# Patient Record
Sex: FEMALE | Race: WHITE | NOT HISPANIC OR LATINO | Employment: FULL TIME | URBAN - METROPOLITAN AREA
[De-identification: names, ages, dates, MRNs, and addresses within clinical notes are randomized per-mention and may not be internally consistent; named-entity substitution may affect disease eponyms.]

---

## 2021-01-17 ENCOUNTER — IMMUNIZATIONS (OUTPATIENT)
Dept: FAMILY MEDICINE CLINIC | Facility: HOSPITAL | Age: 61
End: 2021-01-17

## 2021-01-17 DIAGNOSIS — Z23 ENCOUNTER FOR IMMUNIZATION: Primary | ICD-10-CM

## 2021-01-17 PROCEDURE — 91300 SARS-COV-2 / COVID-19 MRNA VACCINE (PFIZER-BIONTECH) 30 MCG: CPT

## 2021-01-17 PROCEDURE — 0001A SARS-COV-2 / COVID-19 MRNA VACCINE (PFIZER-BIONTECH) 30 MCG: CPT

## 2021-02-05 ENCOUNTER — IMMUNIZATIONS (OUTPATIENT)
Dept: FAMILY MEDICINE CLINIC | Facility: HOSPITAL | Age: 61
End: 2021-02-05

## 2021-02-05 DIAGNOSIS — Z23 ENCOUNTER FOR IMMUNIZATION: Primary | ICD-10-CM

## 2021-02-05 PROCEDURE — 91300 SARS-COV-2 / COVID-19 MRNA VACCINE (PFIZER-BIONTECH) 30 MCG: CPT

## 2021-02-05 PROCEDURE — 0002A SARS-COV-2 / COVID-19 MRNA VACCINE (PFIZER-BIONTECH) 30 MCG: CPT

## 2021-03-20 ENCOUNTER — HOSPITAL ENCOUNTER (EMERGENCY)
Facility: HOSPITAL | Age: 61
Discharge: HOME | End: 2021-03-20
Attending: EMERGENCY MEDICINE | Admitting: EMERGENCY MEDICINE

## 2021-03-20 ENCOUNTER — APPOINTMENT (EMERGENCY)
Dept: RADIOLOGY | Facility: HOSPITAL | Age: 61
End: 2021-03-20

## 2021-03-20 ENCOUNTER — APPOINTMENT (EMERGENCY)
Dept: CT IMAGING | Facility: HOSPITAL | Age: 61
End: 2021-03-20

## 2021-03-20 VITALS
HEIGHT: 64 IN | WEIGHT: 121 LBS | HEART RATE: 86 BPM | DIASTOLIC BLOOD PRESSURE: 95 MMHG | RESPIRATION RATE: 18 BRPM | BODY MASS INDEX: 20.66 KG/M2 | OXYGEN SATURATION: 97 % | TEMPERATURE: 98.4 F | SYSTOLIC BLOOD PRESSURE: 185 MMHG

## 2021-03-20 DIAGNOSIS — S92.001A CLOSED NONDISPLACED FRACTURE OF RIGHT CALCANEUS, UNSPECIFIED PORTION OF CALCANEUS, INITIAL ENCOUNTER: Primary | ICD-10-CM

## 2021-03-20 LAB
ANION GAP SERPL CALCULATED.3IONS-SCNC: 10 MMOL/L (ref 4–13)
APTT PPP: 35 SECONDS (ref 23–37)
BASOPHILS # BLD AUTO: 0.06 THOUSANDS/ΜL (ref 0–0.1)
BASOPHILS NFR BLD AUTO: 0 % (ref 0–1)
BUN SERPL-MCNC: 11 MG/DL (ref 5–25)
CALCIUM SERPL-MCNC: 9.1 MG/DL (ref 8.3–10.1)
CHLORIDE SERPL-SCNC: 102 MMOL/L (ref 100–108)
CO2 SERPL-SCNC: 23 MMOL/L (ref 21–32)
CREAT SERPL-MCNC: 0.74 MG/DL (ref 0.6–1.3)
EOSINOPHIL # BLD AUTO: 0.01 THOUSAND/ΜL (ref 0–0.61)
EOSINOPHIL NFR BLD AUTO: 0 % (ref 0–6)
ERYTHROCYTE [DISTWIDTH] IN BLOOD BY AUTOMATED COUNT: 13.7 % (ref 11.6–15.1)
GFR SERPL CREATININE-BSD FRML MDRD: 90 ML/MIN/1.73SQ M
GLUCOSE SERPL-MCNC: 100 MG/DL (ref 65–140)
HCT VFR BLD AUTO: 41.2 % (ref 34.8–46.1)
HGB BLD-MCNC: 14.1 G/DL (ref 11.5–15.4)
IMM GRANULOCYTES # BLD AUTO: 0.1 THOUSAND/UL (ref 0–0.2)
IMM GRANULOCYTES NFR BLD AUTO: 1 % (ref 0–2)
INR PPP: 0.94 (ref 0.84–1.19)
LYMPHOCYTES # BLD AUTO: 1.25 THOUSANDS/ΜL (ref 0.6–4.47)
LYMPHOCYTES NFR BLD AUTO: 8 % (ref 14–44)
MCH RBC QN AUTO: 28.7 PG (ref 26.8–34.3)
MCHC RBC AUTO-ENTMCNC: 34.2 G/DL (ref 31.4–37.4)
MCV RBC AUTO: 84 FL (ref 82–98)
MONOCYTES # BLD AUTO: 1.36 THOUSAND/ΜL (ref 0.17–1.22)
MONOCYTES NFR BLD AUTO: 8 % (ref 4–12)
NEUTROPHILS # BLD AUTO: 13.59 THOUSANDS/ΜL (ref 1.85–7.62)
NEUTS SEG NFR BLD AUTO: 83 % (ref 43–75)
NRBC BLD AUTO-RTO: 0 /100 WBCS
PLATELET # BLD AUTO: 334 THOUSANDS/UL (ref 149–390)
PMV BLD AUTO: 9.2 FL (ref 8.9–12.7)
POTASSIUM SERPL-SCNC: 4.3 MMOL/L (ref 3.5–5.3)
PROTHROMBIN TIME: 12.7 SECONDS (ref 11.6–14.5)
RBC # BLD AUTO: 4.91 MILLION/UL (ref 3.81–5.12)
SODIUM SERPL-SCNC: 135 MMOL/L (ref 136–145)
WBC # BLD AUTO: 16.37 THOUSAND/UL (ref 4.31–10.16)

## 2021-03-20 PROCEDURE — 99285 EMERGENCY DEPT VISIT HI MDM: CPT | Performed by: PHYSICIAN ASSISTANT

## 2021-03-20 PROCEDURE — 74177 CT ABD & PELVIS W/CONTRAST: CPT

## 2021-03-20 PROCEDURE — 29515 APPLICATION SHORT LEG SPLINT: CPT | Performed by: EMERGENCY MEDICINE

## 2021-03-20 PROCEDURE — 73630 X-RAY EXAM OF FOOT: CPT

## 2021-03-20 PROCEDURE — 99284 EMERGENCY DEPT VISIT MOD MDM: CPT

## 2021-03-20 PROCEDURE — 97163 PT EVAL HIGH COMPLEX 45 MIN: CPT

## 2021-03-20 PROCEDURE — 85025 COMPLETE CBC W/AUTO DIFF WBC: CPT | Performed by: EMERGENCY MEDICINE

## 2021-03-20 PROCEDURE — 85610 PROTHROMBIN TIME: CPT | Performed by: EMERGENCY MEDICINE

## 2021-03-20 PROCEDURE — NC001 PR NO CHARGE: Performed by: NURSE PRACTITIONER

## 2021-03-20 PROCEDURE — 99284 EMERGENCY DEPT VISIT MOD MDM: CPT | Performed by: SURGERY

## 2021-03-20 PROCEDURE — 96376 TX/PRO/DX INJ SAME DRUG ADON: CPT

## 2021-03-20 PROCEDURE — 96374 THER/PROPH/DIAG INJ IV PUSH: CPT

## 2021-03-20 PROCEDURE — 85730 THROMBOPLASTIN TIME PARTIAL: CPT | Performed by: EMERGENCY MEDICINE

## 2021-03-20 PROCEDURE — 73700 CT LOWER EXTREMITY W/O DYE: CPT

## 2021-03-20 PROCEDURE — 36415 COLL VENOUS BLD VENIPUNCTURE: CPT | Performed by: EMERGENCY MEDICINE

## 2021-03-20 PROCEDURE — G1004 CDSM NDSC: HCPCS

## 2021-03-20 PROCEDURE — 80048 BASIC METABOLIC PNL TOTAL CA: CPT | Performed by: EMERGENCY MEDICINE

## 2021-03-20 PROCEDURE — 99244 OFF/OP CNSLTJ NEW/EST MOD 40: CPT | Performed by: ORTHOPAEDIC SURGERY

## 2021-03-20 PROCEDURE — 96375 TX/PRO/DX INJ NEW DRUG ADDON: CPT

## 2021-03-20 RX ORDER — OXYCODONE HYDROCHLORIDE 5 MG/1
5 TABLET ORAL EVERY 4 HOURS PRN
Qty: 30 TABLET | Refills: 0 | Status: SHIPPED | OUTPATIENT
Start: 2021-03-20 | End: 2021-03-30

## 2021-03-20 RX ORDER — METHOCARBAMOL 500 MG/1
750 TABLET, FILM COATED ORAL EVERY 6 HOURS SCHEDULED
Status: DISCONTINUED | OUTPATIENT
Start: 2021-03-20 | End: 2021-03-20 | Stop reason: HOSPADM

## 2021-03-20 RX ORDER — ACETAMINOPHEN 325 MG/1
975 TABLET ORAL EVERY 8 HOURS SCHEDULED
Status: DISCONTINUED | OUTPATIENT
Start: 2021-03-20 | End: 2021-03-20 | Stop reason: HOSPADM

## 2021-03-20 RX ORDER — HYDROMORPHONE HCL/PF 1 MG/ML
0.2 SYRINGE (ML) INJECTION
Status: DISCONTINUED | OUTPATIENT
Start: 2021-03-20 | End: 2021-03-20 | Stop reason: HOSPADM

## 2021-03-20 RX ORDER — METHOCARBAMOL 750 MG/1
750 TABLET, FILM COATED ORAL EVERY 6 HOURS SCHEDULED
Qty: 45 TABLET | Refills: 0 | Status: SHIPPED | OUTPATIENT
Start: 2021-03-20 | End: 2021-05-07

## 2021-03-20 RX ORDER — OXYCODONE HYDROCHLORIDE 5 MG/1
5 TABLET ORAL EVERY 4 HOURS PRN
Status: DISCONTINUED | OUTPATIENT
Start: 2021-03-20 | End: 2021-03-20 | Stop reason: HOSPADM

## 2021-03-20 RX ORDER — ACETAMINOPHEN 325 MG/1
975 TABLET ORAL EVERY 8 HOURS
Qty: 30 TABLET | Refills: 0 | Status: SHIPPED | OUTPATIENT
Start: 2021-03-20 | End: 2021-05-07

## 2021-03-20 RX ORDER — GABAPENTIN 100 MG/1
100 CAPSULE ORAL 3 TIMES DAILY
Status: DISCONTINUED | OUTPATIENT
Start: 2021-03-20 | End: 2021-03-20 | Stop reason: HOSPADM

## 2021-03-20 RX ORDER — OXYCODONE HYDROCHLORIDE 10 MG/1
10 TABLET ORAL EVERY 4 HOURS PRN
Status: DISCONTINUED | OUTPATIENT
Start: 2021-03-20 | End: 2021-03-20 | Stop reason: HOSPADM

## 2021-03-20 RX ORDER — GABAPENTIN 100 MG/1
100 CAPSULE ORAL 3 TIMES DAILY
Qty: 60 CAPSULE | Refills: 0 | Status: SHIPPED | OUTPATIENT
Start: 2021-03-20 | End: 2021-05-07

## 2021-03-20 RX ORDER — OXYCODONE HYDROCHLORIDE AND ACETAMINOPHEN 5; 325 MG/1; MG/1
1 TABLET ORAL ONCE
Status: COMPLETED | OUTPATIENT
Start: 2021-03-20 | End: 2021-03-20

## 2021-03-20 RX ORDER — HYDROMORPHONE HCL/PF 1 MG/ML
0.5 SYRINGE (ML) INJECTION ONCE
Status: COMPLETED | OUTPATIENT
Start: 2021-03-20 | End: 2021-03-20

## 2021-03-20 RX ORDER — ONDANSETRON 2 MG/ML
4 INJECTION INTRAMUSCULAR; INTRAVENOUS ONCE
Status: COMPLETED | OUTPATIENT
Start: 2021-03-20 | End: 2021-03-20

## 2021-03-20 RX ADMIN — ACETAMINOPHEN 975 MG: 325 TABLET, FILM COATED ORAL at 08:00

## 2021-03-20 RX ADMIN — HYDROMORPHONE HYDROCHLORIDE 0.2 MG: 1 INJECTION, SOLUTION INTRAMUSCULAR; INTRAVENOUS; SUBCUTANEOUS at 12:38

## 2021-03-20 RX ADMIN — HYDROMORPHONE HYDROCHLORIDE 0.5 MG: 1 INJECTION, SOLUTION INTRAMUSCULAR; INTRAVENOUS; SUBCUTANEOUS at 09:08

## 2021-03-20 RX ADMIN — OXYCODONE HYDROCHLORIDE AND ACETAMINOPHEN 1 TABLET: 5; 325 TABLET ORAL at 06:44

## 2021-03-20 RX ADMIN — ONDANSETRON 4 MG: 2 INJECTION INTRAMUSCULAR; INTRAVENOUS at 09:07

## 2021-03-20 RX ADMIN — METHOCARBAMOL TABLETS 750 MG: 500 TABLET, COATED ORAL at 08:15

## 2021-03-20 RX ADMIN — OXYCODONE HYDROCHLORIDE 10 MG: 10 TABLET ORAL at 11:20

## 2021-03-20 RX ADMIN — GABAPENTIN 100 MG: 100 CAPSULE ORAL at 08:15

## 2021-03-20 RX ADMIN — IOHEXOL 100 ML: 350 INJECTION, SOLUTION INTRAVENOUS at 10:16

## 2021-03-20 NOTE — PLAN OF CARE
Problem: PHYSICAL THERAPY ADULT  Goal: Performs mobility at highest level of function for planned discharge setting  See evaluation for individualized goals  Description: Treatment/Interventions: Functional transfer training, LE strengthening/ROM, Elevations, Therapeutic exercise, Endurance training, Patient/family training, Equipment eval/education, Bed mobility, Gait training  Equipment Recommended: Lorie Martinez       See flowsheet documentation for full assessment, interventions and recommendations  Note: Prognosis: Good  Problem List: Decreased strength, Decreased endurance, Impaired balance, Decreased mobility, Decreased safety awareness, Orthopedic restrictions, Pain  Assessment: Pt is a 64 y o  female seen for PT evaluation s/p admit to [address] [place] [address] on 3/20/2021 w/ calcaneal fracture  Order placed for PT  Comorbidities affecting pt's physical performance at time of assessment listed above  Personal factors affecting pt at time of IE include: multi-level environment, limited home support, inability to perform IADLs, inability to perform ADLs, inability to ambulate household distances, limited insight into impairments and recent fall(s)  Prior to admission, pt was was independent w/ all functional mobility w/ out AD, ambulated community distances and elevations, lived in multi-level home, lived with  and worked full time  Upon evaluation: Pt requires mod I for bed mobility, supervision for sit to stand, and supervision for ambulation with RW  (Please find full objective findings from PT assessment regarding body systems outlined above)  Impairments and limitations also listed above, especially due to  weakness, impaired balance, decreased endurance, gait deviations, pain, decreased activity tolerance, decreased safety awareness, fall risk and orthopedic restrictions  The following objective measures performed on IE also reveal limitations: Barthel Index 65/100   Pt's clinical presentation is currently unstable/unpredictable seen in pt's presentation of decreased safety awareness, fall risk, and new WBS  Pt to benefit from continued skilled PT tx while in hospital and upon DC to address deficits as defined above and maximize level of functional mobility  From PT/mobility standpoint, recommendation at time of d/c would be home with family support and with rolling walker pending progress  Recommend progression of ambulation and stair negotiation as appropriate  PT Discharge Recommendation: Return to previous environment with social support          See flowsheet documentation for full assessment

## 2021-03-20 NOTE — H&P
H&P Exam - Trauma   Cass Dowell 64 y o  female MRN: [id number]  Unit/Bed#: ED 27 Encounter: [id number]    Assessment/Plan   Trauma Alert: Evaluation  Model of Arrival: Self  Trauma Team: Attending To and MAHSA Read  Consultants: Orthopedics: spoke with Dr Susan Harvey, in house and seen patient  Discussed plan of care, placed Bethea Splint on patient  Trauma Active Problems: Right calcaneal fracture    Trauma Plan: Ortho consulted  Further imaging  NWB RLE  Bethea splint applied  Follow up with Ortho in 1 week  PT/OT  CT a/p - fall from height,     Chief Complaint: Right heel pain    History of Present Illness   HPI:  Cass Dowell is a 64 y o  female who presents with right foot pain  States she experienced a fall yesterday somewhere from 4-5 feet straight down, landed on her feet between the air conditioning units  Reports dod not strike her head or any other part of her body but her feet  Instant pain in right foot, through night would crawl to get to bathroom , etc   This morning came to ER for evaluation  X-rays demonstrate Right calcaneal fracture  No complaint of chest pain or shortness of breath  No complaint of abdominal pain, mild low back pain which she states is normal for her  Right foot splinted by ER, Ortho consulted and seen patinet  Mechanism:Fall - 4-5 foot height  Review of Systems   Constitutional: Positive for activity change  HENT: Negative  Eyes: Negative  Respiratory: Negative  Cardiovascular: Negative  Gastrointestinal: Negative  Endocrine: Negative  Genitourinary: Negative  Musculoskeletal:        Right heel pain   Skin: Negative  Allergic/Immunologic: Negative  Neurological: Negative  Hematological: Negative  Psychiatric/Behavioral: Negative  12-point, complete review of systems was reviewed and negative except as stated above  Historical Information   History is obtainable from the patient    Efforts to obtain history included the following sources:  also present in room    History reviewed  No pertinent past medical history  Past Surgical History:   Procedure Laterality Date    HYSTERECTOMY       Social History   Social History     Substance and Sexual Activity   Alcohol Use Yes    Frequency: 2-3 times a week    Drinks per session: 1 or 2    Binge frequency: Weekly     Social History     Substance and Sexual Activity   Drug Use Never     Social History     Tobacco Use   Smoking Status Current Every Day Smoker    Packs/day: 1 00    Types: Cigarettes   Smokeless Tobacco Never Used     E-Cigarette/Vaping     E-Cigarette/Vaping Substances     Immunization History   Administered Date(s) Administered    SARS-CoV-2 / COVID-19 mRNA IM ([employer]) 01/17/2021, 02/05/2021     Last Tetanus: UTD  Family History: Non-contributory        Meds/Allergies   all current active meds have been reviewed    No Known Allergies      PHYSICAL EXAM      Objective   Vitals:   First set: Temperature: 98 4 °F (36 9 °C) (03/20/21 0640)  Pulse: (!) 111 (03/20/21 0640)  Respirations: 20 (03/20/21 0640)  Blood Pressure: (!) 225/131 (03/20/21 0640)    Primary Survey:   (A) Airway: patent and intact  (B) Breathing: non-labored  (C) Circulation: Pulses:   Normal  (D) Disabliity:  GCS Total:  15, Eye Opening:   Spontaneous = 4, Motor Response: Obeys commands = 6 and Verbal Response:  Oriented = 5  (E) Expose:  Completed    Secondary Survey: (Click on Physical Exam tab above)  Physical Exam  Vitals signs reviewed  Constitutional:       Appearance: Normal appearance  HENT:      Head: Normocephalic and atraumatic  Right Ear: Tympanic membrane normal       Left Ear: Tympanic membrane normal       Nose: Nose normal       Mouth/Throat:      Pharynx: Oropharynx is clear  Eyes:      Extraocular Movements: Extraocular movements intact  Conjunctiva/sclera: Conjunctivae normal       Pupils: Pupils are equal, round, and reactive to light     Neck: Musculoskeletal: Normal range of motion and neck supple  Cardiovascular:      Rate and Rhythm: Normal rate and regular rhythm  Pulses: Normal pulses  Heart sounds: Normal heart sounds  Pulmonary:      Effort: Pulmonary effort is normal  No respiratory distress  Breath sounds: Normal breath sounds  No stridor  No wheezing, rhonchi or rales  Chest:      Chest wall: No tenderness  Abdominal:      General: Bowel sounds are normal       Palpations: Abdomen is soft  Genitourinary:     Comments: deferred  Musculoskeletal:         General: Swelling, tenderness and signs of injury present  Right lower leg: Edema present  Skin:     General: Skin is warm and dry  Capillary Refill: Capillary refill takes less than 2 seconds  Neurological:      General: No focal deficit present  Mental Status: She is alert and oriented to person, place, and time  Psychiatric:         Mood and Affect: Mood normal          Behavior: Behavior normal          Thought Content: Thought content normal          Judgment: Judgment normal          Invasive Devices     None                 Lab Results: Results for William Mcmullen (MRN [id number]) as of 3/20/2021 10:15   Ref   Range 3/20/2021 07:06 3/20/2021 08:57   Sodium Latest Ref Range: 136 - 145 mmol/L  135 (L)   Potassium Latest Ref Range: 3 5 - 5 3 mmol/L  4 3   Chloride Latest Ref Range: 100 - 108 mmol/L  102   CO2 Latest Ref Range: 21 - 32 mmol/L  23   Anion Gap Latest Ref Range: 4 - 13 mmol/L  10   BUN Latest Ref Range: 5 - 25 mg/dL  11   Creatinine Latest Ref Range: 0 60 - 1 30 mg/dL  0 74   Glucose, Random Latest Ref Range: 65 - 140 mg/dL  100   Calcium Latest Ref Range: 8 3 - 10 1 mg/dL  9 1   eGFR Latest Units: ml/min/1 73sq m  90   WBC Latest Ref Range: 4 31 - 10 16 Thousand/uL  16 37 (H)   Red Blood Cell Count Latest Ref Range: 3 81 - 5 12 Million/uL  4 91   Hemoglobin Latest Ref Range: 11 5 - 15 4 g/dL  14 1   HCT Latest Ref Range: 34 8 - 46 1 % 41 2   MCV Latest Ref Range: 82 - 98 fL  84   MCH Latest Ref Range: 26 8 - 34 3 pg  28 7   MCHC Latest Ref Range: 31 4 - 37 4 g/dL  34 2   RDW Latest Ref Range: 11 6 - 15 1 %  13 7   Platelet Count Latest Ref Range: 149 - 390 Thousands/uL  334   MPV Latest Ref Range: 8 9 - 12 7 fL  9 2   nRBC Latest Units: /100 WBCs  0   Neutrophils % Latest Ref Range: 43 - 75 %  83 (H)   Immat GRANS % Latest Ref Range: 0 - 2 %  1   Lymphocytes Relative Latest Ref Range: 14 - 44 %  8 (L)   Monocytes Relative Latest Ref Range: 4 - 12 %  8   Eosinophils Latest Ref Range: 0 - 6 %  0   Basophils Relative Latest Ref Range: 0 - 1 %  0   Immature Grans Absolute Latest Ref Range: 0 00 - 0 20 Thousand/uL  0 10   Absolute Neutrophils Latest Ref Range: 1 85 - 7 62 Thousands/µL  13 59 (H)   Lymphocytes Absolute Latest Ref Range: 0 60 - 4 47 Thousands/µL  1 25   Absolute Monocytes Latest Ref Range: 0 17 - 1 22 Thousand/µL  1 36 (H)   Absolute Eosinophils Latest Ref Range: 0 00 - 0 61 Thousand/µL  0 01   Basophils Absolute Latest Ref Range: 0 00 - 0 10 Thousands/µL  0 06   Protime Latest Ref Range: 11 6 - 14 5 seconds  12 7   INR Latest Ref Range: 0 84 - 1 19   0 94   PTT Latest Ref Range: 23 - 37 seconds  35   XR FOOT 3+ VW LEFT Unknown Rpt    XR FOOT 3+ VW RIGHT Unknown Rpt      Imaging/EKG Studies:   Other Studies: Left foot - neg  Right foot x-ray - Multiple fractures of the calcaneus without significant displacement  These could be further evaluated with CT  CT A/P -  CT Right foot -     Code Status: No Order  Advance Directive and Living Will:      Power of :    POLST:

## 2021-03-20 NOTE — ED PROVIDER NOTES
History  Chief Complaint   Patient presents with    Leg Injury     Patient reports loss of balance, falling off 4ft steps and landing on b/l balls of feet - occurred last evening  Icing/elevating/motrin overnight w/o relief to pain  Patient is a 56-year-old female who presents to the emergency department for evaluation of bilateral foot pain  She states she lost her balance and fell from approximately 4 ft landing on the balls of her feet  She states she has been having pain, particularly in her right foot since  She states the pain is mostly in her heels bilaterally  She took Advil last night, however she got no relief  She states she was up all night because of the pain  The patient is unable to bear weight on her right foot  She has not taken anything yet this morning for the pain  She did not hit her head or lose consciousness during the fall  She is not on any blood thinners  She reports no further injuries at this time  History provided by:  Patient   used: No        None       History reviewed  No pertinent past medical history  Past Surgical History:   Procedure Laterality Date    HYSTERECTOMY         History reviewed  No pertinent family history  I have reviewed and agree with the history as documented  E-Cigarette/Vaping     E-Cigarette/Vaping Substances     Social History     Tobacco Use    Smoking status: Current Every Day Smoker     Packs/day: 1 00     Types: Cigarettes    Smokeless tobacco: Never Used   Substance Use Topics    Alcohol use: Yes     Frequency: 2-3 times a week     Drinks per session: 1 or 2     Binge frequency: Weekly    Drug use: Never       Review of Systems   Constitutional: Negative for chills and fever  HENT: Negative for ear pain and sore throat  Eyes: Negative for redness and visual disturbance  Respiratory: Negative for cough and shortness of breath  Cardiovascular: Negative for chest pain     Gastrointestinal: Negative for abdominal pain, diarrhea, nausea and vomiting  Genitourinary: Negative for dysuria and hematuria  Musculoskeletal: Positive for myalgias  Negative for back pain, neck pain and neck stiffness  Skin: Positive for color change (Bruising)  Negative for rash  Neurological: Negative for dizziness, light-headedness and headaches  All other systems reviewed and are negative  Physical Exam  Physical Exam  Vitals signs and nursing note reviewed  Constitutional:       General: She is not in acute distress  Appearance: She is well-developed  She is not ill-appearing or toxic-appearing  HENT:      Head: Normocephalic and atraumatic  Mouth/Throat:      Pharynx: Uvula midline  Eyes:      General: Lids are normal       Conjunctiva/sclera: Conjunctivae normal    Neck:      Musculoskeletal: Normal range of motion and neck supple  Cardiovascular:      Pulses: Normal pulses  Pulmonary:      Effort: Pulmonary effort is normal  No respiratory distress  Musculoskeletal:      Right ankle: Normal       Left ankle: Normal       Right foot: Decreased range of motion  Tenderness, bony tenderness and swelling present  Left foot: Tenderness and bony tenderness present  Feet:    Skin:     General: Skin is warm and dry  Neurological:      General: No focal deficit present  Mental Status: She is alert and oriented to person, place, and time  Sensory: Sensation is intact           Vital Signs  ED Triage Vitals [03/20/21 0640]   Temperature Pulse Respirations Blood Pressure SpO2   98 4 °F (36 9 °C) (!) 111 20 (!) 225/131 100 %      Temp Source Heart Rate Source Patient Position - Orthostatic VS BP Location FiO2 (%)   Oral Monitor Sitting Left arm --      Pain Score       8           Vitals:    03/20/21 0900 03/20/21 1000 03/20/21 1100 03/20/21 1200   BP: (!) 212/98 (!) 208/100 170/87 (!) 185/95   Pulse: (!) 106 96 82 86   Patient Position - Orthostatic VS: Lying Lying Lying Lying Visual Acuity  Visual Acuity      Most Recent Value   L Pupil Size (mm)  3   R Pupil Size (mm)  3          ED Medications  Medications   oxyCODONE-acetaminophen (PERCOCET) 5-325 mg per tablet 1 tablet (1 tablet Oral Given 3/20/21 0644)   ondansetron (ZOFRAN) injection 4 mg (4 mg Intravenous Given 3/20/21 0907)   HYDROmorphone (DILAUDID) injection 0 5 mg (0 5 mg Intravenous Given 3/20/21 0908)   iohexol (OMNIPAQUE) 350 MG/ML injection (MULTI-DOSE) 100 mL (100 mL Intravenous Given 3/20/21 1016)       Diagnostic Studies  Results Reviewed     Procedure Component Value Units Date/Time    Basic metabolic panel [[id number]]  (Abnormal) Collected: 03/20/21 0857    Lab Status: Final result Specimen: Blood from Arm, Right Updated: 03/20/21 0947     Sodium 135 mmol/L      Potassium 4 3 mmol/L      Chloride 102 mmol/L      CO2 23 mmol/L      ANION GAP 10 mmol/L      BUN 11 mg/dL      Creatinine 0 74 mg/dL      Glucose 100 mg/dL      Calcium 9 1 mg/dL      eGFR 90 ml/min/1 73sq m     Narrative:      [place] guidelines for Chronic Kidney Disease (CKD):     Stage 1 with normal or high GFR (GFR > 90 mL/min/1 73 square meters)    Stage 2 Mild CKD (GFR = 60-89 mL/min/1 73 square meters)    Stage 3A Moderate CKD (GFR = 45-59 mL/min/1 73 square meters)    Stage 3B Moderate CKD (GFR = 30-44 mL/min/1 73 square meters)    Stage 4 Severe CKD (GFR = 15-29 mL/min/1 73 square meters)    Stage 5 End Stage CKD (GFR <15 mL/min/1 73 square meters)  Note: GFR calculation is accurate only with a steady state creatinine    Protime-INR [[id number]]  (Normal) Collected: 03/20/21 0857    Lab Status: Final result Specimen: Blood from Arm, Right Updated: 03/20/21 0932     Protime 12 7 seconds      INR 0 94    APTT [[id number]]  (Normal) Collected: 03/20/21 0857    Lab Status: Final result Specimen: Blood from Arm, Right Updated: 03/20/21 0932     PTT 35 seconds     CBC and differential [[id number]]  (Abnormal) Collected: 03/20/21 0857    Lab Status: Final result Specimen: Blood from Arm, Right Updated: 03/20/21 0905     WBC 16 37 Thousand/uL      RBC 4 91 Million/uL      Hemoglobin 14 1 g/dL      Hematocrit 41 2 %      MCV 84 fL      MCH 28 7 pg      MCHC 34 2 g/dL      RDW 13 7 %      MPV 9 2 fL      Platelets 566 Thousands/uL      nRBC 0 /100 WBCs      Neutrophils Relative 83 %      Immat GRANS % 1 %      Lymphocytes Relative 8 %      Monocytes Relative 8 %      Eosinophils Relative 0 %      Basophils Relative 0 %      Neutrophils Absolute 13 59 Thousands/µL      Immature Grans Absolute 0 10 Thousand/uL      Lymphocytes Absolute 1 25 Thousands/µL      Monocytes Absolute 1 36 Thousand/µL      Eosinophils Absolute 0 01 Thousand/µL      Basophils Absolute 0 06 Thousands/µL                  CT lower extremity wo contrast right   Final Result by Anita Dos Santos MD (03/20 1115)      Comminuted nondisplaced calcaneal fracture with extension to the subtalar joint  Workstation performed: [id number]         CT abdomen pelvis w contrast   Final Result by Reece Chambers MD (03/20 1052)      No acute traumatic injury to the abdomen and pelvis  Left colonic diverticulosis without diverticulitis  Workstation performed: [id number]         XR foot 3+ views RIGHT   Final Result by Pastor Chan MD (03/20 0028)      Multiple fractures of the calcaneus without significant displacement  These could be further evaluated with CT  The study was marked in [facility] for immediate notification  Workstation performed: [id number]         XR foot 3+ views LEFT   Final Result by Pastor Chan MD (03/20 0813)      No acute osseous abnormality              Workstation performed: [id number]                    Procedures  Procedures         ED Course                             SBIRT 22yo+      Most Recent Value   SBIRT (22 yo +)   In order to provide better care to our patients, we are screening all of our patients for alcohol and drug use  Would it be okay to ask you these screening questions? Yes Filed at: 03/20/2021 7394   Initial Alcohol Screen: US AUDIT-C    1  How often do you have a drink containing alcohol? 4 Filed at: 03/20/2021 0704   2  How many drinks containing alcohol do you have on a typical day you are drinking? 1 Filed at: 03/20/2021 0704   3a  Male UNDER 65: How often do you have five or more drinks on one occasion? 0 Filed at: 03/20/2021 0704   3b  FEMALE Any Age, or MALE 65+: How often do you have 4 or more drinks on one occassion? 0 Filed at: 03/20/2021 0704   Audit-C Score  5 Filed at: 03/20/2021 8481   Full Alcohol Screen: US AUDIT   4  How often during the last year have you found that you were not able to stop drinking once you had started? 0 Filed at: 03/20/2021 0704   5  How often during past year have you failed to do what was normally expected of you because of drinking? 0 Filed at: 03/20/2021 0704   7  How often in past year have you had feeling of guilt or remorse after drinking? 0 Filed at: 03/20/2021 0704   8  How often in past year have you been unable to remember what happened night before because you had been drinking? 0 Filed at: 03/20/2021 0704   9  Have you or someone else been injured as a result of your drinking? 0 Filed at: 03/20/2021 0704   10  Has a relative, friend, doctor or other health worker been concerned about your drinking and suggested you cut down?   0 Filed at: 03/20/2021 0704   MANDO: How many times in the past year have you    Used an illegal drug or used a prescription medication for non-medical reasons?   Never Filed at: 03/20/2021 0704                    MDM  Number of Diagnoses or Management Options  Closed nondisplaced fracture of right calcaneus, unspecified portion of calcaneus, initial encounter: new and requires workup  Diagnosis management comments: Patient presents for evaluation of right foot pain after sustaining a fall of 4 ft landing directly on her feet  X-rays of bilateral feet ordered  Percocet ordered for pain  X-rays reviewed  X-ray of right foot shows a calcaneal fracture  Trauma was consulted  They advised that ortho will evaluate the patient  Splint was placed by Dr Catina Coombs  Of note, the patient's blood pressure has been noted to be significantly elevated, however she appears anxious and is in the significant amount of pain  Will monitor  Case was signed out pending Trauma and Orthopedic evaluation  Please see Trauma and Orthopedic notes for more details  Amount and/or Complexity of Data Reviewed  Tests in the radiology section of CPT®: ordered and reviewed  Decide to obtain previous medical records or to obtain history from someone other than the patient: yes  Review and summarize past medical records: yes  Discuss the patient with other providers: yes (Dr Catina Coombs)    Risk of Complications, Morbidity, and/or Mortality  Presenting problems: moderate  Diagnostic procedures: moderate  Management options: moderate    Patient Progress  Patient progress: stable      Disposition  Final diagnoses:   Closed nondisplaced fracture of right calcaneus, unspecified portion of calcaneus, initial encounter     Time reflects when diagnosis was documented in both MDM as applicable and the Disposition within this note     Time User Action Codes Description Comment    3/20/2021  7:49 AM Vaishali Alberto Add [S92 001A] Closed nondisplaced fracture of right calcaneus, unspecified portion of calcaneus, initial encounter       ED Disposition     ED Disposition Condition Date/Time Comment    Discharge  Sat Mar 20, 2021  1:23 PM Manileonel Clemens discharge to home/self care            Follow-up Information     Follow up With Specialties Details Why Contact Info Additional Information    Antoinette Webb MD Orthopedic Surgery Follow up call for appointment to be seen in 1 week , right calcaneal fracture [address] [facility] 26 Harleen Alvarado General Surgery Follow up No trauma follow up required, please call if any questions or concerns [address] [phone]  [address] [place] [address],  Harleen Moser [facility], [place], [place], [phone]          Discharge Medication List as of 3/20/2021 12:51 PM      START taking these medications    Details   acetaminophen (TYLENOL) 325 mg tablet Take 3 tablets (975 mg total) by mouth every 8 (eight) hours, Starting Sat 3/20/2021, Normal      gabapentin (NEURONTIN) 100 mg capsule Take 1 capsule (100 mg total) by mouth 3 (three) times a day, Starting Sat 3/20/2021, Normal      methocarbamol (ROBAXIN) 750 mg tablet Take 1 tablet (750 mg total) by mouth every 6 (six) hours, Starting Sat 3/20/2021, Normal      oxyCODONE (ROXICODONE) 5 mg immediate release tablet Take 1 tablet (5 mg total) by mouth every 4 (four) hours as needed for moderate pain for up to 10 daysMax Daily Amount: 30 mg, Starting Sat 3/20/2021, Until Tue 3/30/2021, Normal           Outpatient Discharge Orders   Walker     Discharge Diet     No strenuous exercise     Weight Bearing Restrictions     No driving until     May return to work/school on:     Call provider for:  severe uncontrolled pain     Call provider for:  difficulty breathing, headache or visual disturbances       PDMP Review       Value Time User    PDMP Reviewed  Yes 3/20/2021  6:30 AM Rene Holder MD          ED Provider  Electronically Signed by           Miriam Lopez PA-C  03/20/21 2957

## 2021-03-20 NOTE — ED PROCEDURE NOTE
PROCEDURE  Splint application    Date/Time: 3/20/2021 7:45 AM  Performed by: Rolly Krueger MD  Authorized by: Rolly Krueger MD   Universal Protocol:  Consent: Verbal consent obtained  Consent given by: patient  Time out: Immediately prior to procedure a "time out" was called to verify the correct patient, procedure, equipment, support staff and site/side marked as required  Timeout called at: 3/20/2021 7:45 AM   Patient identity confirmed: verbally with patient      Pre-procedure details:     Sensation:  Normal  Procedure details:     Laterality:  Right    Location:  Foot    Foot:  R foot    Strapping: no      Splint type:  Short leg    Supplies:  Ortho-Glass, skin protective strip and elastic bandage  Post-procedure details:     Pain:  Unchanged    Sensation:  Unchanged    Skin color:  Pink  Good cap refill    Patient tolerance of procedure:   Tolerated well, no immediate complications         Rolly Krueger MD  03/20/21 0319

## 2021-03-20 NOTE — CONSULTS
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  Patient was closing her kitchen door on her back porch where she fell onto her air conditioners she immediately had heel pain was unable to walk  She came to the ER    Pain is well localized to the heel without radiation  She does have pain in the left heel but not nearly what the right heel is  The right heel she is unable to put any weight on whatsoever  The left heel hurts but she is able to put some weight on  Is made worse with foot motion or contact to the area  Patient Denies numbness or tingling  Review Of Systems:   · Skin: Normal  · Neuro: See HPI  · Musculoskeletal: See HPI  · 14 point review of systems negative except as stated above     Past Medical History:   History reviewed  No pertinent past medical history  Past Surgical History:   Past Surgical History:   Procedure Laterality Date    HYSTERECTOMY         Family History:  Family history reviewed and non-contributory  History reviewed  No pertinent family history      Social History:  Social History     Socioeconomic History    Marital status: Single     Spouse name: None    Number of children: None    Years of education: None    Highest education level: None   Occupational History    None   Social Needs    Financial resource strain: None    Food insecurity     Worry: None     Inability: None    Transportation needs     Medical: None     Non-medical: None   Tobacco Use    Smoking status: Current Every Day Smoker     Packs/day: 1 00     Types: Cigarettes    Smokeless tobacco: Never Used   Substance and Sexual Activity    Alcohol use: Yes     Frequency: 2-3 times a week     Drinks per session: 1 or 2     Binge frequency: Weekly    Drug use: Never    Sexual activity: None   Lifestyle    Physical activity     Days per week: None     Minutes per session: None    Stress: None   Relationships    Social connections     Talks on phone: None     Gets together: None     Attends Confucianism service: None     Active member of club or organization: None     Attends meetings of clubs or organizations: None     Relationship status: None    Intimate partner violence     Fear of current or ex partner: None     Emotionally abused: None     Physically abused: None     Forced sexual activity: None   Other Topics Concern    None   Social History Narrative    None       Allergies:   No Known Allergies        Labs:  0   Lab Value Date/Time    HCT 41 2 03/20/2021 0857    HGB 14 1 03/20/2021 0857    INR 0 94 03/20/2021 0857    WBC 16 37 (H) 03/20/2021 0857       Meds:    Current Facility-Administered Medications:     acetaminophen (TYLENOL) tablet 975 mg, 975 mg, Oral, Q8H [facility], Falguni Read, CLIFTONNP, 975 mg at 03/20/21 0800    gabapentin (NEURONTIN) capsule 100 mg, 100 mg, Oral, TID, Harlon Bellow, CRNP, 100 mg at 03/20/21 0815    HYDROmorphone (DILAUDID) injection 0 2 mg, 0 2 mg, Intravenous, Q3H PRN, Harlon Bellow, CRNP    methocarbamol (ROBAXIN) tablet 750 mg, 750 mg, Oral, Q6H [facility] Falguni Ornelas CRNP, 750 mg at 03/20/21 0815    oxyCODONE (ROXICODONE) immediate release tablet 10 mg, 10 mg, Oral, Q4H PRN, Harlon Bellow, CRNP    oxyCODONE (ROXICODONE) IR tablet 5 mg, 5 mg, Oral, Q4H PRN, Harlon Bellow, CRNP  No current outpatient medications on file  Blood Culture:   No results found for: BLOODCX    Wound Culture:   No results found for: WOUNDCULT    Ins and Outs:  No intake/output data recorded            Physical Exam:   BP (!) 212/98 (BP Location: Right arm)   Pulse (!) 106   Temp 98 4 °F (36 9 °C) (Oral)   Resp 18   Ht 5' 4" (1 626 m)   Wt 54 9 kg (121 lb)   SpO2 97%   BMI 20 77 kg/m²   Gen: Alert and oriented to person, place, time  HEENT: EOMI, eyes clear, moist mucus membranes, hearing intact  Respiratory: Bilateral chest rise  No audible wheezing found  Cardiovascular: Regular Rate and Rhythm  Abdomen: soft nontender/nondistended  Musculoskeletal: bilateral lower extremity  · Skin intact  · TTP around the calcaneus right no tenderness to palpation over the left calcaneus  · SILT s/s/sp/dp/t  · Motor intact with Hip flexion/extension, knee flexion/extension, ankle dorsi/plantar flexion, EHL/FHL  · Palpable DP and PT pulses    Tertiary: no tenderness over all other joints/long bones as except already stated  Radiology:   I personally reviewed the films  X rays of Bilateral calcaneus shows comminuted nondisplaced calcaneus fracture right calcaneus no fracture noted on the left calcaneus     _*_*_*_*_*_*_*_*_*_*_*_*_*_*_*_*_*_*_*_*_*_*_*_*_*_*_*_*_*_*_*_*_*_*_*_*_*_*_*_*_*    Assessment:  64 y  o female s/p fall from height with a right nondisplaced calcaneus fracture this does not seem to involve the joint surface on x-ray  Placed in bulky clark splint    Plan:   · NWB right lower extremity in bulky clark splint  · Elevation of extremity above heart at all times  · Follow-up with Ortho Trauma  · CT scan ordered  · Pain control  · DVT ppx  · Body mass index is 20 77 kg/m²     · Dispo: Lotus Chavira for discharge from ortho perspective after CT scan  · Weight-bearing as tolerated left lower extremity    DO ROSA Green

## 2021-03-20 NOTE — ED ATTENDING ATTESTATION
3/20/2021  IJessica MD, saw and evaluated the patient  I have discussed the patient with the resident/non-physician practitioner and agree with the resident's/non-physician practitioner's findings, Plan of Care, and MDM as documented in the resident's/non-physician practitioner's note, except where noted  All available labs and Radiology studies were reviewed  I was present for key portions of any procedure(s) performed by the resident/non-physician practitioner and I was immediately available to provide assistance  At this point I agree with the current assessment done in the Emergency Department  I have conducted an independent evaluation of this patient a history and physical is as follows:  Patient is a 64year old female who fell off her porch yesterday and landed on the concrete on her heels  (+) bilateral heel pain  Denies other pain or injury  No head injury or LOC  No back pain  No recent old records from this ED seen on computer system  [facility] website checked on this patient and no Rx found   (+) R heel and foot ecchymosis  DDx including but not limited to: Doubt intracranial injury, concussion, cervical injury, intrathoracic injury, intraabdominal injury; extremity injury--fracture, contusion, sprain, strain, dislocation  X-ray with calcaneal fracture on right but not left  Trauma consulted  Trauma AP saw patient in ED and states they will admit but want ortho to see patient first and she consulted ortho  Patient splinted by me (see procedure note)   Signed out to Dr Jayden Motley this AM and ED CASIMIRO FOSS  Signed out to AM ED CASIMIRO Ruiz     ED Course         Critical Care Time  Procedures

## 2021-03-20 NOTE — DISCHARGE INSTRUCTIONS
Calcaneal Fracture   WHAT YOU NEED TO KNOW:   A calcaneal fracture is a break in your calcaneus (heel bone)  DISCHARGE INSTRUCTIONS:   Call your local emergency number (911 in the [address]) if:   · You suddenly feel lightheaded and short of breath  · You have chest pain when you take a deep breath or cough  · You cough up blood  Seek care immediately if:   · You have severe pain  · Your cast breaks or gets damaged  · Your toes are numb, swollen, cold, or pale  · Your leg feels warm, tender, and painful  It may look swollen and red  Call your doctor if:   · You have a fever  · You have new blood stains or a bad smell coming from under your cast     · You have increased pain or swelling, even after treatment  · You have questions or concerns about your condition or care  Medicines: You may need any of the following:  · Prescription pain medicine  may be given  Ask your healthcare provider how to take this medicine safely  Some prescription pain medicines contain acetaminophen  Do not take other medicines that contain acetaminophen without talking to your healthcare provider  Too much acetaminophen may cause liver damage  Prescription pain medicine may cause constipation  Ask your healthcare provider how to prevent or treat constipation  · Antibiotics  help fight or prevent a bacterial infection  · Take your medicine as directed  Contact your healthcare provider if you think your medicine is not helping or if you have side effects  Tell him or her if you are allergic to any medicine  Keep a list of the medicines, vitamins, and herbs you take  Include the amounts, and when and why you take them  Bring the list or the pill bottles to follow-up visits  Carry your medicine list with you in case of an emergency  Self-care:   · Rest  your heel as much as possible  Return to normal activities as directed  · Apply ice  to your heel to decrease swelling and pain   Use an ice pack, or put crushed ice in a plastic bag  Cover the bag with a towel before you place it on your heel  Apply ice for 15 to 20 minutes every hour or as directed  · Elevate  your heel above the level of your heart as often as you can  This will help decrease swelling and pain  Prop your leg on pillows or blankets to keep your heel elevated comfortably  · You may need to take showers  until your healthcare provider says a bath is okay  If you have a cast, cover it with 2 plastic trash bags before you bathe  Tape the bags to your skin to keep the water out  Try to bathe with your foot out of the water in case the bag breaks  · Go to physical therapy  if directed  A physical therapist teaches you exercises to help improve movement and strength, and to decrease pain  Follow up with your doctor as directed:  Write down your questions so you remember to ask them during your visits  © Copyright [address] Information is for End User's use only and may not be sold, redistributed or otherwise used for commercial purposes  All illustrations and images included in CareNotes® are the copyrighted property of A D A M , Inc  or [address] [place] [address]  The above information is an  only  It is not intended as medical advice for individual conditions or treatments  Talk to your doctor, nurse or pharmacist before following any medical regimen to see if it is safe and effective for you        OREN MOSER  Keep Bethea Splint clean and dry  Follow up with Orthopedics in 1 week

## 2021-03-20 NOTE — DISCHARGE SUMMARY
Discharge Summary - Oksana Bass 64 y o  female MRN: [id number]    Unit/Bed#: ED 27 Encounter: [id number]    Admission Date:     Admitting Diagnosis: Ankle injury [S99 919A]    HPI: Oksana Bass is a 64 y o  female who presents with right foot pain  States she experienced a fall yesterday somewhere from 4-5 feet straight down, landed on her feet between the air conditioning units  Reports dod not strike her head or any other part of her body but her feet  Instant pain in right foot, through night would crawl to get to bathroom , etc   This morning came to ER for evaluation  X-rays demonstrate Right calcaneal fracture  No complaint of chest pain or shortness of breath  No complaint of abdominal pain, mild low back pain which she states is normal for her  Right foot splinted by ER, Ortho consulted and seen patinet        Procedures Performed:   Orders Placed This Encounter   Procedures    Splint application       Summary of Hospital Course: Patient seen and evaluated in ED  Scans demonstrated a right calcaneal fracture, seen by Ortho and amber splint applied  Will be seen by Therapy for assistive device, OREN MOSER,  Will then be discharged home  She can follow up with PCP, and to see Ortho in 1 week  Significant Findings, Care, Treatment and Services Provided: Xr Foot 3+ Views Left    Result Date: 3/20/2021  Impression: No acute osseous abnormality  Workstation performed: [id number]     Xr Foot 3+ Views Right    Result Date: 3/20/2021  Impression: Multiple fractures of the calcaneus without significant displacement  These could be further evaluated with CT  The study was marked in [facility] for immediate notification  Workstation performed: [id number]     CT right foot:     Comminuted nondisplaced calcaneal fracture with extension to the subtalar joint      CT abd/pelvis:  Left colonic Diverticulosis, no traumatic injuries     Complications: none    Discharge Diagnosis: S/P Fall  Right Calcaneal Fracture    Resolved Problems  Date Reviewed: 3/20/2021    None          Condition at Discharge: stable         Discharge instructions/Information to patient and family:   See after visit summary for information provided to patient and family  Provisions for Follow-Up Care:  See after visit summary for information related to follow-up care and any pertinent home health orders  PCP: No primary care provider on file  Disposition: Home    Planned Readmission: No      Discharge Statement   I spent 30 minutes discharging the patient  This time was spent on the day of discharge  I had direct contact with the patient on the day of discharge  Additional documentation is required if more than 30 minutes were spent on discharge  Discharge Medications:  See after visit summary for reconciled discharge medications provided to patient and family

## 2021-03-20 NOTE — PHYSICAL THERAPY NOTE
PHYSICAL THERAPY EVALUATION  NAME: Zaira Bhardwaj  AGE:   64 y o  MRN:  [id number]  ADMIT DX: Ankle injury [M36 [id number]]    PMH: History reviewed  No pertinent past medical history  LENGTH OF STAY: 0       21 1210   PT Last Visit   PT Visit Date 21   Note Type   Note type Evaluation   Pain Assessment   Pain Assessment Tool 0-10   Pain Score 5   Pain Location/Orientation Orientation: Right;Location: Foot   Hospital Pain Intervention(s) Ambulation/increased activity;Repositioned   Home Living   Type of [address] Multi-level;Bed/bath upstairs  (0 YARA; 4 stairs to 2nd floor and full flight to 3rd floor)   Additional Comments Ambulates independently without AD at baseline  Prior Function   Level of [place] Independent with ADLs and functional mobility   Lives With Spouse   Receives Help From Family; [address] in the last 6 months 1 to 4  (1 admitting fall)   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)   Restrictions/Precautions   [place] Bearing Precautions Per Order Yes   RLE [place] Bearing Per Order NWB   LLE Weight Bearing Per Order WBAT   Braces or Orthoses   (bulky clark dressing RLE)   Other Precautions WBS; Fall Risk;Pain   General   Additional Pertinent History RLE calcaneal fracture   Family/Caregiver Present Yes  ()   Cognition   Overall Cognitive Status WFL   Arousal/Participation Cooperative   Orientation Level Oriented X4   Memory Within functional limits   Following Commands Follows all commands and directions without difficulty   Comments Pt identified by name and   RLE Assessment   RLE Assessment X   Strength RLE   RLE Overall Strength 4/5  (functionally)   LLE Assessment   LLE Assessment X   Strength LLE   LLE Overall Strength 4/5  (functionally)   Bed Mobility   Supine to Sit 6  Modified independent   Additional items HOB elevated; Increased time required   Sit to Supine 6  Modified independent   Additional items Increased time required   Transfers   Sit to Stand 5  Supervision   Additional items Increased time required;Verbal cues   Stand to Sit 5  Supervision   Additional items Increased time required;Verbal cues   Additional Comments maintains NWB well; reports having to weightbear on toe of LLE due to pain despite no fracture   Ambulation/Elevation   Gait pattern Improper Weight shift;Decreased foot clearance; Short stride; Excessively slow; Step to  ("hopping" method to maintain NWB)   Gait Assistance 5  Supervision   Additional items Verbal cues   Assistive Device Rolling walker   Distance 12` x2   Stair Management Assistance Not tested  (pt educated on technique; reports scooting up/down )   Balance   Static Sitting Normal   Dynamic Sitting Good   Static Standing Fair +   Dynamic Standing Fair   Ambulatory Fair   Endurance Deficit   Endurance Deficit Yes   Endurance Deficit Description limited ambulation distance, reports UE fatigue   Activity Tolerance   Activity Tolerance Patient limited by fatigue;Patient limited by pain   Nurse Made Aware Per RN, pt appropriate to evaluate   Assessment   Prognosis Good   Problem List Decreased strength;Decreased endurance; Impaired balance;Decreased mobility; Decreased safety awareness;Orthopedic restrictions;Pain   Goals   Patient Goals to go home and visit kamaljit barajas   STG Expiration Date 03/29/21   Short Term Goal #1 Pt will be able to: (1) perform bed mobility with mod I to decrease caregiver burden (2) perform sit to stand with mod I to increase level of independence and promote safe toiletting and transfers (3) ambulate at least 200` with mod I and least restrictive AD to increase activity tolerance and allow for safe community mobilization (4) increase standing balance by 1 grade to decrease risk of falls (5) negotiate at least a full flight of stairs with mod I while maintaining NWB RLE   PT Treatment Day 0   Plan   Treatment/Interventions Functional transfer training;LE strengthening/ROM; Elevations; Therapeutic exercise; Endurance training;Patient/family training;Equipment eval/education; Bed mobility;Gait training   PT Frequency Other (Comment)  (3-5x/week)   Recommendation   PT Discharge Recommendation Return to previous environment with social support   Equipment Recommended [place] walker   [address] Mobility Inpatient   Turning in Bed Without Bedrails 4   Lying on Back to Sitting on Edge of Flat Bed 4   Moving Bed to Chair 3   Standing Up From Chair 3   Walk in Room 3   Climb 3-5 Stairs 3   Basic Mobility Inpatient Raw Score 20   Basic Mobility Standardized Score 43 99   Barthel Index   Feeding 10   Bathing 5   Grooming Score 5   Dressing Score 10   Bladder Score 10   Bowels Score 10   Toilet Use Score 5   Transfers (Bed/Chair) Score 10   Mobility (Level Surface) Score 0   Stairs Score 0   Barthel Index Score 65   The patient's AM-PAC Basic Mobility Inpatient Short Form Raw Score is 20  , Standardized Score is 43 99    A standardized score greater than 42 9 suggests the patient may benefit from discharge to home  Please also refer to the recommendation of the Physical Therapist for safe discharge planning  Assessment: Pt is a 64 y o  female seen for PT evaluation s/p admit to [address] [place] [address] on 3/20/2021 w/ calcaneal fracture  Order placed for PT  Comorbidities affecting pt's physical performance at time of assessment listed above  Personal factors affecting pt at time of IE include: multi-level environment, limited home support, inability to perform IADLs, inability to perform ADLs, inability to ambulate household distances, limited insight into impairments and recent fall(s)  Prior to admission, pt was was independent w/ all functional mobility w/ out AD, ambulated community distances and elevations, lived in multi-level home, lived with  and worked full time   Upon evaluation: Pt requires mod I for bed mobility, supervision for sit to stand, and supervision for ambulation with RW  (Please find full objective findings from PT assessment regarding body systems outlined above)  Impairments and limitations also listed above, especially due to  weakness, impaired balance, decreased endurance, gait deviations, pain, decreased activity tolerance, decreased safety awareness, fall risk and orthopedic restrictions  The following objective measures performed on IE also reveal limitations: Barthel Index 65/100  Pt's clinical presentation is currently unstable/unpredictable seen in pt's presentation of decreased safety awareness, fall risk, and new WBS  Pt to benefit from continued skilled PT tx while in hospital and upon DC to address deficits as defined above and maximize level of functional mobility  From PT/mobility standpoint, recommendation at time of d/c would be home with family support and with rolling walker pending progress  Recommend progression of ambulation and stair negotiation as appropriate        Christine Westbrook, PT,DPT

## 2021-03-20 NOTE — ED NOTES
Patient transported to [address] [place] [address], [address] [place] [address]  03/20/21 0414

## 2021-03-22 ENCOUNTER — TELEPHONE (OUTPATIENT)
Dept: OBGYN CLINIC | Facility: HOSPITAL | Age: 61
End: 2021-03-22

## 2021-03-22 NOTE — TELEPHONE ENCOUNTER
Patient called to schedule with Dr Guru Davison for one week, following an ED visit for a calcaneal fracture  Xrays - comminuted, nondisplaced calcaneal fracture with extension to the subtalar joint  I consulted Dr Juan Lewis, Dr Emma Payne, and Dr Luis Hernandez schedules for an appointment Friday 03-26-21  I could put the patient with Dalia Joiner on Monday 03-29-21, but do not want her to wait too long, if she shouldnt  I was unable to reach [facility] to consult  MRN: [id number]  Patient Name: Viraj ALFORD B: 1960  Dept & Loc: Melinda Shearer or Derrick  Appt Notes: NP/RT HEEL FX/SL IMAGING/SELF PAY  Visit Type: new patient  Provider Name: Selma Franco  Appointment Desired Day/Time: Friday 03-29-21  [facility]# Sofia [email or web address] "Fiorella Goldy" Would  Patient   [address] [place] [address] Physician Group  [email or web address]  [address] [place] [address]  www [email or web address]  [email or web address]      Sent to [facility] via [employer]

## 2021-03-24 ENCOUNTER — OFFICE VISIT (OUTPATIENT)
Dept: OBGYN CLINIC | Facility: CLINIC | Age: 61
End: 2021-03-24

## 2021-03-24 VITALS — SYSTOLIC BLOOD PRESSURE: 195 MMHG | HEART RATE: 106 BPM | DIASTOLIC BLOOD PRESSURE: 98 MMHG

## 2021-03-24 DIAGNOSIS — S90.32XA CONTUSION OF LEFT HEEL, INITIAL ENCOUNTER: ICD-10-CM

## 2021-03-24 DIAGNOSIS — S92.014A CLOSED NONDISPLACED FRACTURE OF BODY OF RIGHT CALCANEUS, INITIAL ENCOUNTER: Primary | ICD-10-CM

## 2021-03-24 PROCEDURE — 99214 OFFICE O/P EST MOD 30 MIN: CPT | Performed by: ORTHOPAEDIC SURGERY

## 2021-03-24 NOTE — PROGRESS NOTES
Assessment/Plan:  1  Closed nondisplaced fracture of body of right calcaneus, initial encounter     2  Contusion of left heel, initial encounter  Cam Boot     The patient has a comminuted, nondisplaced calcaneal fracture with joint surface intact  She is also a daily smoker  As this fracture is nondisplaced it does appear this will heal well with nonoperative treatment  We discussed that being a smoker also excludes her from surgical intervention for this fracture, as she would be at very high risk for wound complications and infection  We did place her in a Bethea dressing today for her right foot  SHe will remain nonweightbearing on this leg for 6 weeks  She may remove her Bethea dressing in 3 weeks and replace this with an ace bandage  THe patient also has significant pain about the left calcaneus as well as bruising  Though no fracture was seen on xrays, she may have a small fracture if CT or MRI was done  WE will treat this as a contusion for now  She was given a short aircast boot to wear and she may weightbear as tolerated on this leg  We discussed that she is at high risk for blood clot  As the patient does not have health insurance, Lovenox is too expensive for her and thus we do advise a full 325 mg aspirin daily for DVT prophylaxis  She will call immediately if she develops any calf pain, cramping, or swelling  We also discussed smoking cessation today  She will follow-up in 6 weeks with repeat xrays of the right foot  Subjective:   Zaira Bhardwaj is a 64 y o  female who presents today for evaluation of her bilateral feet  She sustained a fall off of her porch on 3/20/21, falling about 4 feet and landing on both heels  She did go to the ED and had xrays which showed no fracture of the left foot, but a calcaneal fracture of the right foot  CT of the right foot confirmed a comminuted but nondisplaced calcaneal fracture  We are able to view these images today   She has been in a splint for her right foot and has been nonweightbearing  She has been doing some very minimal weightbearing on the left lower extremity, though this has been very painful to do  She notes swelling about the right foot and bruising about both feet  She notes good sensation of the bilateral lower extremities  She denies any calf pain or cramping  She is an everyday smoker  Review of Systems   Constitutional: Negative  Negative for chills and fever  HENT: Negative  Negative for ear pain and sore throat  Eyes: Negative  Negative for pain and redness  Respiratory: Negative  Negative for shortness of breath and wheezing  Cardiovascular: Negative for chest pain and palpitations  Gastrointestinal: Negative  Negative for abdominal pain and blood in stool  Endocrine: Negative  Negative for polydipsia and polyuria  Genitourinary: Negative  Negative for difficulty urinating and dysuria  Musculoskeletal:        As noted in HPI   Skin: Negative  Negative for pallor and rash  Neurological: Negative  Negative for dizziness and numbness  Hematological: Negative  Negative for adenopathy  Does not bruise/bleed easily  Psychiatric/Behavioral: Negative  Negative for confusion and suicidal ideas  History reviewed  No pertinent past medical history      Past Surgical History:   Procedure Laterality Date    HYSTERECTOMY         Family History   Problem Relation Age of Onset    No Known Problems Mother     No Known Problems Father        Social History     Occupational History    Not on file   Tobacco Use    Smoking status: Current Every Day Smoker     Packs/day: 1 00     Types: Cigarettes    Smokeless tobacco: Never Used   Substance and Sexual Activity    Alcohol use: Yes     Frequency: 2-3 times a week     Drinks per session: 1 or 2     Binge frequency: Weekly    Drug use: Never    Sexual activity: Not on file         Current Outpatient Medications:     acetaminophen (TYLENOL) 325 mg tablet, Take 3 tablets (975 mg total) by mouth every 8 (eight) hours, Disp: 30 tablet, Rfl: 0    gabapentin (NEURONTIN) 100 mg capsule, Take 1 capsule (100 mg total) by mouth 3 (three) times a day, Disp: 60 capsule, Rfl: 0    methocarbamol (ROBAXIN) 750 mg tablet, Take 1 tablet (750 mg total) by mouth every 6 (six) hours, Disp: 45 tablet, Rfl: 0    oxyCODONE (ROXICODONE) 5 mg immediate release tablet, Take 1 tablet (5 mg total) by mouth every 4 (four) hours as needed for moderate pain for up to 10 daysMax Daily Amount: 30 mg, Disp: 30 tablet, Rfl: 0    No Known Allergies    Objective:  Vitals:    03/24/21 0924   BP: (!) 195/98   Pulse: (!) 106       Right Ankle Exam     Tenderness   Right ankle tenderness location: Tenderness calcaneus  Swelling: moderate (foot and heel)    Other   Erythema: absent  Sensation: normal  Pulse: present     Comments:  Ecchymosis plantar foot      Left Ankle Exam     Tenderness   Left ankle tenderness location: tenderness calcaneus  Swelling: none    Other   Erythema: absent  Sensation: normal  Pulse: present    Comments:  Ecchymosis plantar heel            Physical Exam  Constitutional:       General: She is not in acute distress  Appearance: She is well-developed  HENT:      Head: Normocephalic and atraumatic  Eyes:      General: No scleral icterus  Conjunctiva/sclera: Conjunctivae normal    Neck:      Vascular: No JVD  Cardiovascular:      Rate and Rhythm: Normal rate  Pulmonary:      Effort: Pulmonary effort is normal  No respiratory distress  Skin:     General: Skin is warm  Neurological:      Mental Status: She is alert and oriented to person, place, and time  Coordination: Coordination normal          I have personally reviewed pertinent films in PACS and my interpretation is as follows:  Xrays left foot from 3/20/21: Negative  Xrays and CT right foot from 3/20/21: Comminuted, nondisplaced fracture of the calcaneus  Joint surface intact

## 2021-03-24 NOTE — LETTER
March 24, 2021     Patient: Tomasa Sims   YOB: 1960   Date of Visit: 3/24/2021       To Whom it May Concern:    Tomasa Sims is under my professional care  She was seen in my office on 3/24/2021  She will be out of work until 4/3/21  If you have any questions or concerns, please don't hesitate to call           Sincerely,          Clive Dumont MD        CC: No Recipients

## 2021-04-16 ENCOUNTER — TELEPHONE (OUTPATIENT)
Dept: OBGYN CLINIC | Facility: HOSPITAL | Age: 61
End: 2021-04-16

## 2021-04-16 NOTE — TELEPHONE ENCOUNTER
Patient states that she is uninsured and has no PCP  The gabapentin is the only thing she was taking and it was helping for the heel pain  It was ordered at discharge from [address] [place] [address] Tammie  Please advise if you can help her out  She has been taking it for a month

## 2021-04-16 NOTE — TELEPHONE ENCOUNTER
Dr Parag Argueta  Re: medication  CB# [phone]        Patient is requesting gabapentin 100mg to be filled by Dr Parag Argueta  Patient states Gabapentin was helping her greatly with her heel pain   Patient states gabepentin was prescribed by ER       gabapentin (NEURONTIN) 100 mg capsule [[id number]]     Order Details  Dose: 100 mg Route: Oral Frequency: 3 times daily   Dispense Quantity: 60 capsule Refills: 0 Fills remaining: --             [id number] [place] - [address] [place] [address]   [phone]

## 2021-05-07 ENCOUNTER — OFFICE VISIT (OUTPATIENT)
Dept: OBGYN CLINIC | Facility: CLINIC | Age: 61
End: 2021-05-07

## 2021-05-07 ENCOUNTER — APPOINTMENT (OUTPATIENT)
Dept: RADIOLOGY | Facility: CLINIC | Age: 61
End: 2021-05-07

## 2021-05-07 DIAGNOSIS — S92.014D CLOSED NONDISPLACED FRACTURE OF BODY OF RIGHT CALCANEUS WITH ROUTINE HEALING, SUBSEQUENT ENCOUNTER: Primary | ICD-10-CM

## 2021-05-07 DIAGNOSIS — S92.014A CLOSED NONDISPLACED FRACTURE OF BODY OF RIGHT CALCANEUS, INITIAL ENCOUNTER: ICD-10-CM

## 2021-05-07 PROCEDURE — 73630 X-RAY EXAM OF FOOT: CPT

## 2021-05-07 PROCEDURE — 99213 OFFICE O/P EST LOW 20 MIN: CPT | Performed by: ORTHOPAEDIC SURGERY

## 2021-05-07 RX ORDER — IBUPROFEN 200 MG
TABLET ORAL EVERY 6 HOURS PRN
COMMUNITY

## 2021-05-07 NOTE — PROGRESS NOTES
Assessment/Plan:  1  Closed nondisplaced fracture of body of right calcaneus with routine healing, subsequent encounter  XR foot 3+ vw right       Scribe Attestation    I,:  Teresa Guevara am acting as a scribe while in the presence of the attending physician :       I,:  Marj Frye MD personally performed the services described in this documentation    as scribed in my presence :           Rocael Denton upon examination and review of the x-rays of the right foot demonstrates significant healing of the calcaneal body fracture  She does demonstrate mild point tenderness medially of the calcaneus  However, it is greatly improved  She does also demonstrate improvements with her range of motion and strength into the foot and ankle with mild exacerbation of pain with resisted inversion  I do believe continuation of non operative treatment is most appropriate for her  As her symptoms begin to subside, she may wean herself off of her cane  She may continue to utilize the  compression wrap on her foot for swelling mitigation  She verbalized understanding and had no further questions  I would like her to follow up with me in 6 weeks time for repeat clinical evaluation and repeat x-ray  Subjective:   Garlan Pallas is a 64 y o  female who presents to the office today for follow-up evaluation of her right foot  She is approximately 7 weeks status post initial traumatic injury to her foot resulting in a calcaneal body fracture  She states that over the past week she has been doing very well and has had improvements in regards to her pain  She is able of a cane  She does utilize a coban wrap on her foot to help mitigate her swelling  She does still have complaints of bruising to the plantar medial aspect of her foot extending towards the toes  She also has a  Nerve sensation extending to the ball of her foot  She states that she is most symptomatic towards the end of the day when she is most swollen    However, remarks that elevation and compression does alleviate the symptoms  She denies any distal paresthesias today  Review of Systems   Constitutional: Negative for chills, fever and unexpected weight change  HENT: Negative for hearing loss, nosebleeds and sore throat  Eyes: Negative for pain, redness and visual disturbance  Respiratory: Negative for cough, shortness of breath and wheezing  Cardiovascular: Negative for chest pain, palpitations and leg swelling  Gastrointestinal: Negative for abdominal pain, nausea and vomiting  Endocrine: Negative for polydipsia and polyuria  Genitourinary: Negative for dysuria and hematuria  Musculoskeletal: Positive for arthralgias and gait problem  See HPI   Skin: Negative for rash and wound  Neurological: Negative for dizziness, numbness and headaches  Psychiatric/Behavioral: Negative for decreased concentration and suicidal ideas  The patient is not nervous/anxious  History reviewed  No pertinent past medical history  Past Surgical History:   Procedure Laterality Date    HYSTERECTOMY         Family History   Problem Relation Age of Onset    No Known Problems Mother     No Known Problems Father        Social History     Occupational History    Not on file   Tobacco Use    Smoking status: Current Every Day Smoker     Packs/day: 1 00     Types: Cigarettes    Smokeless tobacco: Never Used   Substance and Sexual Activity    Alcohol use: Yes     Frequency: 2-3 times a week     Drinks per session: 1 or 2     Binge frequency: Weekly    Drug use: Never    Sexual activity: Not on file         Current Outpatient Medications:     ibuprofen (MOTRIN) 200 mg tablet, Take by mouth every 6 (six) hours as needed for mild pain, Disp: , Rfl:     No Known Allergies    Objective: There were no vitals filed for this visit  Right Ankle Exam     Tenderness   Right ankle tenderness location:  mild tenderness to the medial calcaneus    Swelling: mild    Range of Motion   Dorsiflexion: 25   Plantar flexion: 40   Eversion: 10   Inversion: 10     Muscle Strength   Dorsiflexion:  5/5  Plantar flexion:  5/5    Other   Erythema: absent  Scars: absent  Sensation: normal  Pulse: present           Observations     Right Ankle/Foot   Negative for adhesive scar  Strength/Myotome Testing     Right Ankle/Foot   Dorsiflexion: 5  Plantar flexion: 5      Physical Exam  Vitals signs reviewed  HENT:      Head: Normocephalic and atraumatic  Eyes:      General:         Right eye: No discharge  Left eye: No discharge  Conjunctiva/sclera: Conjunctivae normal       Pupils: Pupils are equal, round, and reactive to light  Neck:      Musculoskeletal: Normal range of motion and neck supple  Cardiovascular:      Rate and Rhythm: Normal rate  Pulmonary:      Effort: Pulmonary effort is normal  No respiratory distress  Musculoskeletal:      Comments: As noted in HPI   Skin:     General: Skin is warm and dry  Neurological:      Mental Status: She is alert and oriented to person, place, and time  I have personally reviewed pertinent films in PACS and my interpretation is as follows:    X-rays of the right foot demonstrate a stable healing calcaneal body fracture with no interval displacement

## 2021-06-18 ENCOUNTER — OFFICE VISIT (OUTPATIENT)
Dept: OBGYN CLINIC | Facility: CLINIC | Age: 61
End: 2021-06-18

## 2021-06-18 ENCOUNTER — APPOINTMENT (OUTPATIENT)
Dept: RADIOLOGY | Facility: CLINIC | Age: 61
End: 2021-06-18

## 2021-06-18 VITALS — HEIGHT: 64 IN | WEIGHT: 121 LBS | BODY MASS INDEX: 20.66 KG/M2

## 2021-06-18 DIAGNOSIS — S92.014A CLOSED NONDISPLACED FRACTURE OF BODY OF RIGHT CALCANEUS, INITIAL ENCOUNTER: Primary | ICD-10-CM

## 2021-06-18 DIAGNOSIS — S92.014A CLOSED NONDISPLACED FRACTURE OF BODY OF RIGHT CALCANEUS, INITIAL ENCOUNTER: ICD-10-CM

## 2021-06-18 PROCEDURE — 73630 X-RAY EXAM OF FOOT: CPT

## 2021-06-18 PROCEDURE — 99213 OFFICE O/P EST LOW 20 MIN: CPT | Performed by: ORTHOPAEDIC SURGERY

## 2021-06-18 NOTE — PATIENT INSTRUCTIONS
Ankle Exercises   AMBULATORY CARE:   What you need to know about ankle exercises: Ankle exercises help strengthen your ankle and improve its function after injury  These are beginning exercises  Ask your healthcare provider if you need to see a physical therapist for more advanced exercises  · Do these exercises 3 to 5 days a week , or as directed by your healthcare provider  Ask if you should perform the exercises on each ankle  · Do the exercises in the order that your healthcare provider recommends  This will help prevent swelling, chronic pain, and reinjury  Start with range of motion exercises  Then progress to strengthening exercises, and finally to balancing exercises  · Warm up before you do ankle exercises  Walk or ride a stationary bike for 5 to 10 minutes to prepare your ankle for movement  · Stop if you feel pain  It is normal to feel some discomfort at first  Regular exercise will help decrease your discomfort over time  How to perform range of motion exercises safely:  Begin with range of motion exercises to improve flexibility  Ask your healthcare provider when you can progress to strengthening exercises  · Ankle alphabet:  Sit on a chair so that your feet do not touch the floor  Use your big toe to write each letter of the alphabet  Use only your foot and ankle, and keep your movements small  Do 2 sets  · Calf stretches:      ? Sitting calf stretches with a towel:  Sit on the floor with both legs out straight in front of you  Loop a towel around the ball of your injured foot  Grasp the ends of the towel and pull it toward you  Keep your leg and back straight  Do not lean forward as you pull the towel  Hold for 30 seconds  Then relax for 30 seconds  Do 2 sets of 10          ? Standing calf stretches:  Stand facing a wall with the foot that is not injured forward and your knee slightly bent   Keep the leg with the injured foot straight and behind you with your toes pointed in slightly  With both heels flat on the floor, press your hips forward  Do not arch your back  Hold for 30 seconds, and then relax for 30 seconds  Do 2 sets of 10  Repeat with your leg bent  Do 2 sets of 10  How to perform strengthening exercises safely:  After you can perform range of motion exercises without pain, you may begin strengthening exercises  Ask your healthcare provider when you can progress to balancing exercises  · Ankle movement in 4 directions:  Sit on the floor with your legs straight in front of you  Keep your heels on the floor for support  ? Dorsiflexion:  Begin with your toes pointing straight up  Pull your toes toward your body  Slowly return to the starting position  Do 3 sets of 5      ? Plantar flexion:  Begin with your toes pointing straight up  Push your toes away from your body  Slowly return to the starting position  Do 3 sets of 5          ? Inversion:  Begin with your toes pointing straight up  Push your toes inward, toward each other  Slowly return to the starting position  Do 3 sets of 5      ? Eversion:  Begin with your toes pointing straight up  Push your toes outward, away from each other  Slowly return to the starting position  Do 3 sets of 5        · Toe curls with a towel:  Sit on a chair so that both of your feet are flat on the floor  Place a small towel on the floor in front of your injured foot  Grab the center of the towel with your toes and curl the towel toward you  Relax and repeat  Do 1 set of 5          · [place] pick-ups:  Sit on a chair so that both of your feet are flat on the floor  Place 20 marbles on the floor in front of your injured foot  Use your toes to  one marble at a time and place it into a bowl  Repeat until you have picked up all the marbles  Do 1 set  · Heel raises:      ? Single leg heel raises:  Stand with your weight evenly on both feet  Hold on to a chair or a wall for balance   Lift the foot that is not injured off the floor so all your weight is placed on your injured foot  Raise the heel of your injured foot as high as you can  Slowly lower your heel to the floor  Do 1 set of 10          ? Double leg heel raises:  Stand with your weight evenly on both feet  Hold on to a chair or a wall for balance  Raise both of your heels as high as you can  Slowly lower your heels to the floor  Do 1 set of 10  · Heel and toe walks:      ? Heel walks:  Begin in a standing position  Lift your toes off the floor and walk on your heels  Keep your toes lifted as high as possible  Do 2 sets of 10          ? Toe walks:  Begin in a standing position  Lift your heels off the floor and walk on the balls and toes of your feet  Keep your heels lifted as high as possible  Do 2 sets of 10  How to perform a balance exercise safely:  After you can perform strengthening exercises without pain, you may do this beginning balancing exercise  Ask your healthcare provider for more advanced balance exercises  · Single leg stance:  Stand with your weight evenly on both feet, or hold on to a chair or a wall  Do not lean to the side  Lift the foot that is not injured off the floor so all your weight is placed on your injured foot  Balance on your injured foot  Ask your healthcare provider how long to hold this position  Contact your healthcare provider if:   · Your pain becomes worse  · You have new pain  · You have questions or concerns about your condition, care, or exercise program     © Copyright [address] Information is for End User's use only and may not be sold, redistributed or otherwise used for commercial purposes  All illustrations and images included in CareNotes® are the copyrighted property of [employer]  or [facility]   The above information is an  only  It is not intended as medical advice for individual conditions or treatments   Talk to your doctor, nurse or pharmacist before following any medical regimen to see if it is safe and effective for you

## 2021-06-18 NOTE — PROGRESS NOTES
Assessment/Plan:  1  Closed nondisplaced fracture of body of right calcaneus, initial encounter  XR foot 3+ vw right       Scribe Attestation    I,:  Cassi Valladares am acting as a scribe while in the presence of the attending physician :       I,:  Traci De Santiago MD personally performed the services described in this documentation    as scribed in my presence :             Teresita Holder has done very well  Her x-rays demonstrate a healed calcaneal fracture with callus formation  She is somewhat stiff about the ankle  We did provide her a home exercise program which was reviewed with her by my   She can follow up with me as needed for this  Subjective:   Janelle Haskins is a 64 y o  female who presents to the office today for follow-up evaluation of a right calcaneal fracture suffered approximately 13 weeks ago  She states she is doing very well overall  She feels the best in the morning  She does have a persistent ache about the calcaneus that is improving on a daily basis  She admits to intermittent swelling at the end of her day  She will wear compression socks to help combat this  She states she has been working on her range of motion about the ankle  She will perform ankle pumps as well as circles  She is very pleased with her progress and has no complaints at this time  She denies distal paresthesias  She denies calf pain  Review of Systems   Constitutional: Negative for chills, fever and unexpected weight change  HENT: Negative for hearing loss, nosebleeds and sore throat  Eyes: Negative for pain, redness and visual disturbance  Respiratory: Negative for cough, shortness of breath and wheezing  Cardiovascular: Negative for chest pain, palpitations and leg swelling  Gastrointestinal: Negative for abdominal pain, nausea and vomiting  Endocrine: Negative for polydipsia and polyuria  Genitourinary: Negative for dysuria and hematuria     Musculoskeletal:        See HPI Skin: Negative for rash and wound  Neurological: Negative for dizziness, numbness and headaches  Psychiatric/Behavioral: Negative for decreased concentration and suicidal ideas  The patient is not nervous/anxious  History reviewed  No pertinent past medical history  Past Surgical History:   Procedure Laterality Date    HYSTERECTOMY         Family History   Problem Relation Age of Onset    No Known Problems Mother     No Known Problems Father        Social History     Occupational History    Not on file   Tobacco Use    Smoking status: Current Every Day Smoker     Packs/day: 1 00     Types: Cigarettes    Smokeless tobacco: Never Used   Substance and Sexual Activity    Alcohol use: Yes    Drug use: Never    Sexual activity: Not on file         Current Outpatient Medications:     ibuprofen (MOTRIN) 200 mg tablet, Take by mouth every 6 (six) hours as needed for mild pain, Disp: , Rfl:     No Known Allergies    Objective: There were no vitals filed for this visit  Right Ankle Exam     Tenderness   The patient is experiencing no tenderness  Swelling: none    Range of Motion   Dorsiflexion: 15   Plantar flexion: 40     Muscle Strength   The patient has normal right ankle strength  Other   Erythema: absent  Scars: absent  Sensation: normal  Pulse: present (2+ DP)           Observations     Right Ankle/Foot   Negative for adhesive scar  Strength/Myotome Testing     Right Ankle/Foot   Normal strength      Physical Exam  Vitals reviewed  Constitutional:       Appearance: She is well-developed  HENT:      Head: Normocephalic and atraumatic  Eyes:      General:         Right eye: No discharge  Left eye: No discharge  Conjunctiva/sclera: Conjunctivae normal    Cardiovascular:      Rate and Rhythm: Regular rhythm  Pulmonary:      Effort: Pulmonary effort is normal  No respiratory distress  Breath sounds: No stridor     Musculoskeletal:      Cervical back: Normal range of motion and neck supple  Skin:     General: Skin is warm and dry  Neurological:      Mental Status: She is alert and oriented to person, place, and time  Psychiatric:         Behavior: Behavior normal          I have personally reviewed pertinent films in PACS and my interpretation is as follows:  X-rays of the right foot demonstrate a healed calcaneus fracture with excellent alignment  There is evidence of callus formation  The mortise remains well aligned